# Patient Record
Sex: MALE | Race: WHITE | NOT HISPANIC OR LATINO | URBAN - NONMETROPOLITAN AREA
[De-identification: names, ages, dates, MRNs, and addresses within clinical notes are randomized per-mention and may not be internally consistent; named-entity substitution may affect disease eponyms.]

---

## 2018-06-26 ENCOUNTER — SKIN CHECK (OUTPATIENT)
Dept: URBAN - NONMETROPOLITAN AREA CLINIC 1 | Facility: CLINIC | Age: 63
Setting detail: DERMATOLOGY
End: 2018-06-26

## 2018-06-26 DIAGNOSIS — L57.0 ACTINIC KERATOSIS: ICD-10-CM

## 2018-06-26 PROBLEM — L821 702.19: Status: ACTIVE | Noted: 2018-06-26

## 2018-06-26 PROBLEM — L570 702.0: Status: ACTIVE | Noted: 2018-06-26

## 2018-06-26 PROBLEM — B00.9 HERPESVIRAL INFECTION, UNSPECIFIED: Status: RESOLVED | Noted: 2018-06-26

## 2018-06-26 PROBLEM — B00.9 HERPESVIRAL INFECTION, UNSPECIFIED: Status: ACTIVE | Noted: 2018-06-26

## 2018-06-26 PROCEDURE — 11100 BX SKIN SUBCUTANEOUS&/MUCOUS MEMBRANE 1 LESION: CPT

## 2018-06-26 PROCEDURE — 99203 OFFICE O/P NEW LOW 30 MIN: CPT

## 2018-06-26 PROCEDURE — 17000 DESTRUCT PREMALG LESION: CPT

## 2018-06-26 PROCEDURE — 11101 BIOPSY SKIN SUBQ&/MUCOUS MEMBRANE EA ADDL LESN: CPT

## 2018-06-26 PROCEDURE — 17003 DESTRUCT PREMALG LES 2-14: CPT

## 2018-06-26 RX ORDER — FLUOROURACIL 10 MG/G
1 APPLICATION CREAM TOPICAL BID
Qty: 30 | Refills: 2 | Status: DISCONTINUED
Start: 2018-06-26 | End: 2018-10-02

## 2018-06-26 RX ORDER — ACYCLOVIR 400 MG/1
1 TABLET TABLET ORAL BID
Qty: 60 | Refills: 5 | Status: DISCONTINUED
Start: 2018-06-26 | End: 2019-01-23

## 2018-07-10 ENCOUNTER — ED & C (OUTPATIENT)
Dept: URBAN - NONMETROPOLITAN AREA CLINIC 1 | Facility: CLINIC | Age: 63
Setting detail: DERMATOLOGY
End: 2018-07-10

## 2018-07-10 DIAGNOSIS — L57.0 ACTINIC KERATOSIS: ICD-10-CM

## 2018-07-10 PROCEDURE — 11200 RMVL SKIN TAGS UP TO&INC 15: CPT

## 2018-07-10 PROCEDURE — 17272 DSTR MAL LES S/N/H/F/G 1.1-2: CPT

## 2018-07-10 PROCEDURE — 17262 DSTRJ MAL LES T/A/L 1.1-2.0: CPT

## 2018-07-10 RX ORDER — MUPIROCIN 20 MG/G
1 APPLICATION OINTMENT TOPICAL BID
Qty: 22 | Refills: 2
Start: 2018-07-10

## 2018-07-17 ENCOUNTER — EXCISION (OUTPATIENT)
Dept: URBAN - NONMETROPOLITAN AREA CLINIC 1 | Facility: CLINIC | Age: 63
Setting detail: DERMATOLOGY
End: 2018-07-17

## 2018-07-17 DIAGNOSIS — L57.0 ACTINIC KERATOSIS: ICD-10-CM

## 2018-07-17 PROCEDURE — 12032 INTMD RPR S/A/T/EXT 2.6-7.5: CPT

## 2018-07-17 PROCEDURE — 11100 BX SKIN SUBCUTANEOUS&/MUCOUS MEMBRANE 1 LESION: CPT

## 2018-07-17 PROCEDURE — 11602 EXC TR-EXT MAL+MARG 1.1-2 CM: CPT

## 2018-08-21 ENCOUNTER — EXCISION (OUTPATIENT)
Dept: URBAN - NONMETROPOLITAN AREA CLINIC 1 | Facility: CLINIC | Age: 63
Setting detail: DERMATOLOGY
End: 2018-08-21

## 2018-08-21 DIAGNOSIS — D22.5 MELANOCYTIC NEVI OF TRUNK: ICD-10-CM

## 2018-08-21 DIAGNOSIS — L91.8 OTHER HYPERTROPHIC DISORDERS OF THE SKIN: ICD-10-CM

## 2018-08-21 DIAGNOSIS — L82.1 OTHER SEBORRHEIC KERATOSIS: ICD-10-CM

## 2018-08-21 DIAGNOSIS — Z85.820 PERSONAL HISTORY OF MALIGNANT MELANOMA OF SKIN: ICD-10-CM

## 2018-08-21 DIAGNOSIS — D18.01 HEMANGIOMA OF SKIN AND SUBCUTANEOUS TISSUE: ICD-10-CM

## 2018-08-21 PROCEDURE — 11602 EXC TR-EXT MAL+MARG 1.1-2 CM: CPT

## 2018-08-21 PROCEDURE — 12032 INTMD RPR S/A/T/EXT 2.6-7.5: CPT

## 2018-08-21 PROCEDURE — 17003 DESTRUCT PREMALG LES 2-14: CPT

## 2018-08-21 PROCEDURE — 17000 DESTRUCT PREMALG LESION: CPT

## 2018-08-21 RX ORDER — DOXYCYCLINE HYCLATE 100 MG/1
1 CAPSULE CAPSULE, GELATIN COATED ORAL BID
Qty: 28 | Refills: 0
Start: 2018-08-21

## 2018-09-05 ENCOUNTER — FOLLOW-UP (OUTPATIENT)
Dept: URBAN - NONMETROPOLITAN AREA CLINIC 1 | Facility: CLINIC | Age: 63
Setting detail: DERMATOLOGY
End: 2018-09-05

## 2018-09-05 DIAGNOSIS — L57.0 ACTINIC KERATOSIS: ICD-10-CM

## 2018-09-05 PROCEDURE — 99024 POSTOP FOLLOW-UP VISIT: CPT

## 2018-10-02 ENCOUNTER — SPOT CHECK FOLLOW-UP (OUTPATIENT)
Dept: URBAN - NONMETROPOLITAN AREA CLINIC 1 | Facility: CLINIC | Age: 63
Setting detail: DERMATOLOGY
End: 2018-10-02

## 2018-10-02 DIAGNOSIS — L70.0 ACNE VULGARIS: ICD-10-CM

## 2018-10-02 PROCEDURE — 11100 BX SKIN SUBCUTANEOUS&/MUCOUS MEMBRANE 1 LESION: CPT

## 2018-10-02 PROCEDURE — 99214 OFFICE O/P EST MOD 30 MIN: CPT

## 2018-10-02 RX ORDER — FLUOROURACIL 50 MG/G
1 APPLICATION CREAM TOPICAL BID
Qty: 40 | Refills: 2
Start: 2018-10-02

## 2018-10-16 ENCOUNTER — EXCISION (OUTPATIENT)
Dept: URBAN - NONMETROPOLITAN AREA CLINIC 1 | Facility: CLINIC | Age: 63
Setting detail: DERMATOLOGY
End: 2018-10-16

## 2018-10-16 DIAGNOSIS — D48.5 NEOPLASM OF UNCERTAIN BEHAVIOR OF SKIN: ICD-10-CM

## 2018-10-16 PROCEDURE — 11602 EXC TR-EXT MAL+MARG 1.1-2 CM: CPT

## 2018-10-16 PROCEDURE — 12032 INTMD RPR S/A/T/EXT 2.6-7.5: CPT

## 2018-10-16 PROCEDURE — 11900 INJECT SKIN LESIONS </W 7: CPT

## 2018-10-16 RX ORDER — DOXYCYCLINE HYCLATE 100 MG/1
1 CAPSULE CAPSULE, GELATIN COATED ORAL BID
Qty: 20 | Refills: 0
Start: 2018-10-16

## 2018-10-30 ENCOUNTER — SUTURE REMOVAL (OUTPATIENT)
Dept: URBAN - NONMETROPOLITAN AREA CLINIC 1 | Facility: CLINIC | Age: 63
Setting detail: DERMATOLOGY
End: 2018-10-30

## 2018-10-30 DIAGNOSIS — L57.0 ACTINIC KERATOSIS: ICD-10-CM

## 2018-10-30 PROCEDURE — 99024 POSTOP FOLLOW-UP VISIT: CPT

## 2019-01-23 ENCOUNTER — RX ONLY (RX ONLY)
Age: 64
End: 2019-01-23

## 2019-01-23 RX ORDER — ACYCLOVIR 400 MG/1
1 TABLET TABLET ORAL BID
Qty: 60 | Refills: 5 | Status: DISCONTINUED
Start: 2019-01-23 | End: 2019-08-08

## 2019-05-09 ENCOUNTER — SPOT CHECK FOLLOW-UP (OUTPATIENT)
Dept: URBAN - NONMETROPOLITAN AREA CLINIC 1 | Facility: CLINIC | Age: 64
Setting detail: DERMATOLOGY
End: 2019-05-09

## 2019-05-09 DIAGNOSIS — Z48.817 ENCOUNTER FOR SURGICAL AFTERCARE FOLLOWING SURGERY ON THE SKIN AND SUBCUTANEOUS TISSUE: ICD-10-CM

## 2019-05-09 PROCEDURE — 99214 OFFICE O/P EST MOD 30 MIN: CPT

## 2019-08-08 ENCOUNTER — RX ONLY (RX ONLY)
Age: 64
End: 2019-08-08

## 2019-08-08 RX ORDER — ACYCLOVIR 400 MG/1
1 TABLET TABLET ORAL BID
Qty: 60 | Refills: 5
Start: 2019-08-08

## 2019-12-03 ENCOUNTER — SKIN CHECK (OUTPATIENT)
Dept: URBAN - NONMETROPOLITAN AREA CLINIC 1 | Facility: CLINIC | Age: 64
Setting detail: DERMATOLOGY
End: 2019-12-03

## 2019-12-03 DIAGNOSIS — D48.5 NEOPLASM OF UNCERTAIN BEHAVIOR OF SKIN: ICD-10-CM

## 2019-12-03 PROCEDURE — 99214 OFFICE O/P EST MOD 30 MIN: CPT

## 2019-12-03 PROCEDURE — 11102 TANGNTL BX SKIN SINGLE LES: CPT

## 2019-12-03 RX ORDER — CICLOPIROX 80 MG/ML
1 ML SOLUTION TOPICAL NIGHTLY
Qty: 6.6 | Refills: 3
Start: 2019-12-03

## 2019-12-03 RX ORDER — KETOCONAZOLE 20 MG/G
1 APPLICATION CREAM TOPICAL DAILY
Qty: 60 | Refills: 1 | Status: DISCONTINUED
Start: 2019-12-03 | End: 2020-02-12

## 2020-02-12 ENCOUNTER — RX ONLY (RX ONLY)
Age: 65
End: 2020-02-12

## 2020-02-12 RX ORDER — KETOCONAZOLE 20 MG/G
1 APPLICATION CREAM TOPICAL DAILY
Qty: 60 | Refills: 1
Start: 2020-02-12

## 2020-02-13 ENCOUNTER — RX ONLY (RX ONLY)
Age: 65
End: 2020-02-13

## 2020-02-13 RX ORDER — ACYCLOVIR 400 MG/1
1 TABLET TABLET ORAL BID
Qty: 60 | Refills: 2
Start: 2020-02-13

## 2020-05-19 ENCOUNTER — RX ONLY (RX ONLY)
Age: 65
End: 2020-05-19

## 2020-05-19 RX ORDER — ACYCLOVIR 400 MG/1
1 TABLET TABLET ORAL BID
Qty: 60 | Refills: 0
Start: 2020-05-19

## 2020-06-02 ENCOUNTER — RX ONLY (RX ONLY)
Age: 65
End: 2020-06-02

## 2020-06-02 RX ORDER — FLUOROURACIL 50 MG/G
1 APPLICATION CREAM TOPICAL BID
Qty: 40 | Refills: 1
Start: 2020-06-02

## 2020-06-22 ENCOUNTER — RX ONLY (RX ONLY)
Age: 65
End: 2020-06-22

## 2020-06-22 RX ORDER — ACYCLOVIR 400 MG/1
1 TABLET TABLET ORAL BID
Qty: 60 | Refills: 2
Start: 2020-06-22

## 2020-10-20 ENCOUNTER — RX ONLY (RX ONLY)
Age: 65
End: 2020-10-20

## 2020-10-20 RX ORDER — ACYCLOVIR 400 MG/1
1 TABLET TABLET ORAL TWICE A DAY
Qty: 180 | Refills: 0
Start: 2020-10-20

## 2021-03-01 ENCOUNTER — SKIN CHECK (OUTPATIENT)
Dept: URBAN - NONMETROPOLITAN AREA CLINIC 1 | Facility: CLINIC | Age: 66
Setting detail: DERMATOLOGY
End: 2021-03-01

## 2021-03-01 DIAGNOSIS — L57.0 ACTINIC KERATOSIS: ICD-10-CM

## 2021-03-01 PROCEDURE — 17000 DESTRUCT PREMALG LESION: CPT

## 2021-03-01 PROCEDURE — 11103 TANGNTL BX SKIN EA SEP/ADDL: CPT

## 2021-03-01 PROCEDURE — 11102 TANGNTL BX SKIN SINGLE LES: CPT

## 2021-03-15 ENCOUNTER — RX ONLY (RX ONLY)
Age: 66
End: 2021-03-15

## 2021-03-15 RX ORDER — ACYCLOVIR 400 MG/1
TABLET ORAL
Qty: 180 | Refills: 0
Start: 2021-03-15

## 2021-05-10 ENCOUNTER — RX ONLY (RX ONLY)
Age: 66
End: 2021-05-10

## 2021-05-10 RX ORDER — KETOCONAZOLE 20 MG/G
1 APPLICATION CREAM TOPICAL DAILY
Qty: 60 | Refills: 0
Start: 2021-05-10

## 2021-06-25 ENCOUNTER — RX ONLY (RX ONLY)
Age: 66
End: 2021-06-25

## 2021-06-25 RX ORDER — ACYCLOVIR 400 MG/1
TABLET ORAL
Qty: 180 | Refills: 0
Start: 2021-06-25

## 2021-09-22 ENCOUNTER — RX ONLY (RX ONLY)
Age: 66
End: 2021-09-22

## 2021-09-22 RX ORDER — ACYCLOVIR 400 MG/1
TABLET ORAL
Qty: 180 | Refills: 0
Start: 2021-09-22

## 2022-01-06 ENCOUNTER — RX ONLY (RX ONLY)
Age: 67
End: 2022-01-06

## 2022-01-06 RX ORDER — ACYCLOVIR 400 MG/1
1 TABLET TABLET ORAL TWICE A DAY
Qty: 60 | Refills: 0
Start: 2022-01-06

## 2022-05-23 ENCOUNTER — FOLLOW-UP (OUTPATIENT)
Dept: URBAN - NONMETROPOLITAN AREA CLINIC 1 | Facility: CLINIC | Age: 67
Setting detail: DERMATOLOGY
End: 2022-05-23

## 2022-05-23 DIAGNOSIS — L82.1 OTHER SEBORRHEIC KERATOSIS: ICD-10-CM

## 2022-05-23 PROBLEM — Z85828 V10.83: Status: ACTIVE | Noted: 2022-05-23

## 2022-05-23 PROCEDURE — 99214 OFFICE O/P EST MOD 30 MIN: CPT

## 2022-05-23 PROCEDURE — 17004 DESTROY PREMAL LESIONS 15/>: CPT

## 2022-05-23 PROCEDURE — 17110 DESTRUCT B9 LESION 1-14: CPT

## 2022-05-23 RX ORDER — CICLOPIROX OLAMINE 7.7 MG/G
CREAM TOPICAL
Qty: 90 | Refills: 1
Start: 2022-05-23

## 2022-05-23 RX ORDER — ACYCLOVIR 400 MG/1
1 TABLET TABLET ORAL TWICE A DAY
Qty: 180 | Refills: 0
Start: 2022-05-23

## 2022-09-01 ENCOUNTER — RX ONLY (RX ONLY)
Age: 67
End: 2022-09-01

## 2022-09-01 RX ORDER — ACYCLOVIR 400 MG/1
400 MG TABLET ORAL BID
Qty: 180 | Refills: 1 | Status: ERX | COMMUNITY
Start: 2022-09-01

## 2022-09-06 ENCOUNTER — RX ONLY (RX ONLY)
Age: 67
End: 2022-09-06

## 2022-09-06 RX ORDER — CICLOPIROX OLAMINE 7.7 MG/G
SMALL AMOUNT CREAM TOPICAL BID
Qty: 90 | Refills: 0 | Status: ERX

## 2022-09-26 ENCOUNTER — APPOINTMENT (OUTPATIENT)
Dept: URBAN - NONMETROPOLITAN AREA CLINIC 46 | Age: 67
Setting detail: DERMATOLOGY
End: 2022-09-26

## 2022-09-26 DIAGNOSIS — Z85.828 PERSONAL HISTORY OF OTHER MALIGNANT NEOPLASM OF SKIN: ICD-10-CM

## 2022-09-26 DIAGNOSIS — L57.0 ACTINIC KERATOSIS: ICD-10-CM

## 2022-09-26 DIAGNOSIS — D485 NEOPLASM OF UNCERTAIN BEHAVIOR OF SKIN: ICD-10-CM

## 2022-09-26 DIAGNOSIS — L81.4 OTHER MELANIN HYPERPIGMENTATION: ICD-10-CM

## 2022-09-26 DIAGNOSIS — L82.1 OTHER SEBORRHEIC KERATOSIS: ICD-10-CM

## 2022-09-26 PROBLEM — D48.5 NEOPLASM OF UNCERTAIN BEHAVIOR OF SKIN: Status: ACTIVE | Noted: 2022-09-26

## 2022-09-26 PROCEDURE — OTHER BIOPSY BY SHAVE METHOD: OTHER

## 2022-09-26 PROCEDURE — 11103 TANGNTL BX SKIN EA SEP/ADDL: CPT

## 2022-09-26 PROCEDURE — 17003 DESTRUCT PREMALG LES 2-14: CPT

## 2022-09-26 PROCEDURE — 17000 DESTRUCT PREMALG LESION: CPT | Mod: 59

## 2022-09-26 PROCEDURE — OTHER COUNSELING: OTHER

## 2022-09-26 PROCEDURE — 99213 OFFICE O/P EST LOW 20 MIN: CPT | Mod: 25

## 2022-09-26 PROCEDURE — OTHER LIQUID NITROGEN: OTHER

## 2022-09-26 PROCEDURE — 11102 TANGNTL BX SKIN SINGLE LES: CPT

## 2022-09-26 ASSESSMENT — LOCATION SIMPLE DESCRIPTION DERM
LOCATION SIMPLE: LEFT FOREARM
LOCATION SIMPLE: RIGHT FOREARM
LOCATION SIMPLE: LEFT PRETIBIAL REGION
LOCATION SIMPLE: RIGHT SHOULDER
LOCATION SIMPLE: LEFT UPPER BACK

## 2022-09-26 ASSESSMENT — LOCATION DETAILED DESCRIPTION DERM
LOCATION DETAILED: LEFT DISTAL DORSAL FOREARM
LOCATION DETAILED: RIGHT DISTAL DORSAL FOREARM
LOCATION DETAILED: LEFT DISTAL PRETIBIAL REGION
LOCATION DETAILED: RIGHT POSTERIOR SHOULDER
LOCATION DETAILED: LEFT PROXIMAL PRETIBIAL REGION
LOCATION DETAILED: LEFT SUPERIOR UPPER BACK

## 2022-09-26 ASSESSMENT — LOCATION ZONE DERM
LOCATION ZONE: LEG
LOCATION ZONE: ARM
LOCATION ZONE: TRUNK

## 2022-09-26 NOTE — PROCEDURE: LIQUID NITROGEN
Duration Of Freeze Thaw-Cycle (Seconds): 0
Consent: The patient's consent was obtained including but not limited to risks of crusting, scabbing, blistering, scarring, darker or lighter pigmentary change, recurrence, incomplete removal and infection.
Show Aperture Variable?: Yes
Post-Care Instructions: I reviewed with the patient in detail post-care instructions. Patient is to wear sunprotection, and avoid picking at any of the treated lesions. Pt may apply Vaseline to crusted or scabbing areas.
Detail Level: Simple
Render Note In Bullet Format When Appropriate: No
Number Of Freeze-Thaw Cycles: 3 freeze-thaw cycles

## 2022-10-03 ENCOUNTER — RX ONLY (RX ONLY)
Age: 67
End: 2022-10-03

## 2022-10-03 RX ORDER — FLUOROURACIL 5 MG/G
CREAM TOPICAL
Qty: 40 | Refills: 0 | Status: ERX | COMMUNITY
Start: 2022-10-03

## 2022-11-28 ENCOUNTER — APPOINTMENT (OUTPATIENT)
Dept: URBAN - NONMETROPOLITAN AREA CLINIC 46 | Age: 67
Setting detail: DERMATOLOGY
End: 2022-11-28

## 2022-11-28 DIAGNOSIS — Z71.89 OTHER SPECIFIED COUNSELING: ICD-10-CM

## 2022-11-28 DIAGNOSIS — Z85.828 PERSONAL HISTORY OF OTHER MALIGNANT NEOPLASM OF SKIN: ICD-10-CM

## 2022-11-28 DIAGNOSIS — L81.4 OTHER MELANIN HYPERPIGMENTATION: ICD-10-CM

## 2022-11-28 DIAGNOSIS — D18.0 HEMANGIOMA: ICD-10-CM

## 2022-11-28 DIAGNOSIS — D22 MELANOCYTIC NEVI: ICD-10-CM

## 2022-11-28 DIAGNOSIS — L57.0 ACTINIC KERATOSIS: ICD-10-CM

## 2022-11-28 DIAGNOSIS — L82.0 INFLAMED SEBORRHEIC KERATOSIS: ICD-10-CM

## 2022-11-28 DIAGNOSIS — D485 NEOPLASM OF UNCERTAIN BEHAVIOR OF SKIN: ICD-10-CM

## 2022-11-28 DIAGNOSIS — L82.1 OTHER SEBORRHEIC KERATOSIS: ICD-10-CM

## 2022-11-28 PROBLEM — D18.01 HEMANGIOMA OF SKIN AND SUBCUTANEOUS TISSUE: Status: ACTIVE | Noted: 2022-11-28

## 2022-11-28 PROBLEM — D22.5 MELANOCYTIC NEVI OF TRUNK: Status: ACTIVE | Noted: 2022-11-28

## 2022-11-28 PROBLEM — D48.5 NEOPLASM OF UNCERTAIN BEHAVIOR OF SKIN: Status: ACTIVE | Noted: 2022-11-28

## 2022-11-28 PROCEDURE — 99213 OFFICE O/P EST LOW 20 MIN: CPT | Mod: 25

## 2022-11-28 PROCEDURE — 11103 TANGNTL BX SKIN EA SEP/ADDL: CPT | Mod: 59

## 2022-11-28 PROCEDURE — OTHER COUNSELING: OTHER

## 2022-11-28 PROCEDURE — OTHER LIQUID NITROGEN: OTHER

## 2022-11-28 PROCEDURE — 11102 TANGNTL BX SKIN SINGLE LES: CPT | Mod: 59

## 2022-11-28 PROCEDURE — 17110 DESTRUCT B9 LESION 1-14: CPT

## 2022-11-28 PROCEDURE — 17000 DESTRUCT PREMALG LESION: CPT | Mod: 59

## 2022-11-28 PROCEDURE — 17003 DESTRUCT PREMALG LES 2-14: CPT | Mod: 59

## 2022-11-28 PROCEDURE — OTHER MIPS QUALITY: OTHER

## 2022-11-28 PROCEDURE — OTHER BIOPSY BY SHAVE METHOD: OTHER

## 2022-11-28 ASSESSMENT — LOCATION DETAILED DESCRIPTION DERM
LOCATION DETAILED: RIGHT MEDIAL DISTAL PRETIBIAL REGION
LOCATION DETAILED: LEFT PROXIMAL PRETIBIAL REGION
LOCATION DETAILED: LEFT INFERIOR UPPER BACK
LOCATION DETAILED: RIGHT ELBOW
LOCATION DETAILED: LEFT DISTAL DORSAL FOREARM
LOCATION DETAILED: RIGHT DISTAL CALF
LOCATION DETAILED: LEFT SUPERIOR CENTRAL MALAR CHEEK
LOCATION DETAILED: RIGHT INFERIOR UPPER BACK
LOCATION DETAILED: LEFT MEDIAL ZYGOMA
LOCATION DETAILED: LEFT PROXIMAL DORSAL FOREARM
LOCATION DETAILED: INFERIOR THORACIC SPINE
LOCATION DETAILED: RIGHT INFERIOR MEDIAL UPPER BACK
LOCATION DETAILED: RIGHT KNEE

## 2022-11-28 ASSESSMENT — LOCATION ZONE DERM
LOCATION ZONE: FACE
LOCATION ZONE: ARM
LOCATION ZONE: TRUNK
LOCATION ZONE: LEG

## 2022-11-28 ASSESSMENT — LOCATION SIMPLE DESCRIPTION DERM
LOCATION SIMPLE: LEFT CHEEK
LOCATION SIMPLE: LEFT PRETIBIAL REGION
LOCATION SIMPLE: LEFT FOREARM
LOCATION SIMPLE: RIGHT UPPER BACK
LOCATION SIMPLE: RIGHT KNEE
LOCATION SIMPLE: RIGHT ELBOW
LOCATION SIMPLE: RIGHT CALF
LOCATION SIMPLE: LEFT ZYGOMA
LOCATION SIMPLE: LEFT UPPER BACK
LOCATION SIMPLE: RIGHT PRETIBIAL REGION
LOCATION SIMPLE: UPPER BACK

## 2022-11-28 NOTE — HPI: PREVENTATIVE SKIN CHECK
What Is The Reason For Today's Visit?: Full Body Skin Examination
Additional History: Lesions of concern on Lower legs, tender, red, scaly.

## 2022-11-28 NOTE — PROCEDURE: LIQUID NITROGEN
Render Note In Bullet Format When Appropriate: No
Show Aperture Variable?: Yes
Post-Care Instructions: I reviewed with the patient in detail post-care instructions. Patient is to wear sunprotection, and avoid picking at any of the treated lesions. Pt may apply Vaseline to crusted or scabbing areas.
Number Of Freeze-Thaw Cycles: 3 freeze-thaw cycles
Consent: The patient's consent was obtained including but not limited to risks of crusting, scabbing, blistering, scarring, darker or lighter pigmentary change, recurrence, incomplete removal and infection.
Detail Level: Simple
Duration Of Freeze Thaw-Cycle (Seconds): 0
Spray Paint Text: The liquid nitrogen was applied to the skin utilizing a spray paint frosting technique.
Medical Necessity Clause: This procedure was medically necessary because the lesions that were treated were:
Medical Necessity Information: It is in your best interest to select a reason for this procedure from the list below. All of these items fulfill various CMS LCD requirements except the new and changing color options.

## 2022-11-28 NOTE — PROCEDURE: MIPS QUALITY
Quality 130: Documentation Of Current Medications In The Medical Record: Current Medications Documented
Detail Level: Detailed
Quality 110: Preventive Care And Screening: Influenza Immunization: Influenza Immunization previously received during influenza season
Quality 226: Preventive Care And Screening: Tobacco Use: Screening And Cessation Intervention: Patient screened for tobacco use and is an ex/non-smoker
Quality 111:Pneumonia Vaccination Status For Older Adults: Pneumococcal vaccine (PPSV23) administered on or after patient’s 60th birthday and before the end of the measurement period

## 2022-12-19 ENCOUNTER — RX ONLY (RX ONLY)
Age: 67
End: 2022-12-19

## 2022-12-19 RX ORDER — CICLOPIROX OLAMINE 7.7 MG/G
SMALL AMOUNT CREAM TOPICAL BID
Qty: 90 | Refills: 1 | Status: ERX

## 2023-06-05 ENCOUNTER — APPOINTMENT (OUTPATIENT)
Dept: URBAN - NONMETROPOLITAN AREA CLINIC 46 | Age: 68
Setting detail: DERMATOLOGY
End: 2023-06-06

## 2023-06-05 DIAGNOSIS — L82.0 INFLAMED SEBORRHEIC KERATOSIS: ICD-10-CM

## 2023-06-05 DIAGNOSIS — Z85.828 PERSONAL HISTORY OF OTHER MALIGNANT NEOPLASM OF SKIN: ICD-10-CM

## 2023-06-05 DIAGNOSIS — L82.1 OTHER SEBORRHEIC KERATOSIS: ICD-10-CM

## 2023-06-05 DIAGNOSIS — L81.4 OTHER MELANIN HYPERPIGMENTATION: ICD-10-CM

## 2023-06-05 DIAGNOSIS — D22 MELANOCYTIC NEVI: ICD-10-CM

## 2023-06-05 DIAGNOSIS — Z71.89 OTHER SPECIFIED COUNSELING: ICD-10-CM

## 2023-06-05 DIAGNOSIS — L72.0 EPIDERMAL CYST: ICD-10-CM

## 2023-06-05 DIAGNOSIS — D18.0 HEMANGIOMA: ICD-10-CM

## 2023-06-05 DIAGNOSIS — B07.8 OTHER VIRAL WARTS: ICD-10-CM

## 2023-06-05 DIAGNOSIS — L57.0 ACTINIC KERATOSIS: ICD-10-CM

## 2023-06-05 PROBLEM — D22.5 MELANOCYTIC NEVI OF TRUNK: Status: ACTIVE | Noted: 2023-06-05

## 2023-06-05 PROBLEM — D18.01 HEMANGIOMA OF SKIN AND SUBCUTANEOUS TISSUE: Status: ACTIVE | Noted: 2023-06-05

## 2023-06-05 PROCEDURE — OTHER ADDITIONAL NOTES: OTHER

## 2023-06-05 PROCEDURE — 17000 DESTRUCT PREMALG LESION: CPT | Mod: 59

## 2023-06-05 PROCEDURE — OTHER LIQUID NITROGEN: OTHER

## 2023-06-05 PROCEDURE — OTHER COUNSELING: OTHER

## 2023-06-05 PROCEDURE — OTHER MIPS QUALITY: OTHER

## 2023-06-05 PROCEDURE — 99213 OFFICE O/P EST LOW 20 MIN: CPT | Mod: 25

## 2023-06-05 PROCEDURE — 17003 DESTRUCT PREMALG LES 2-14: CPT | Mod: 59

## 2023-06-05 PROCEDURE — 17110 DESTRUCT B9 LESION 1-14: CPT

## 2023-06-05 ASSESSMENT — LOCATION DETAILED DESCRIPTION DERM
LOCATION DETAILED: RIGHT INFERIOR UPPER BACK
LOCATION DETAILED: INFERIOR THORACIC SPINE
LOCATION DETAILED: RIGHT INFERIOR MEDIAL UPPER BACK
LOCATION DETAILED: RIGHT DISTAL DORSAL FOREARM
LOCATION DETAILED: LEFT INFERIOR UPPER BACK
LOCATION DETAILED: RIGHT MEDIAL DISTAL PRETIBIAL REGION
LOCATION DETAILED: RIGHT CLAVICULAR NECK
LOCATION DETAILED: LEFT PROXIMAL DORSAL MIDDLE FINGER

## 2023-06-05 ASSESSMENT — LOCATION ZONE DERM
LOCATION ZONE: LEG
LOCATION ZONE: ARM
LOCATION ZONE: TRUNK
LOCATION ZONE: FINGER
LOCATION ZONE: NECK

## 2023-06-05 ASSESSMENT — LOCATION SIMPLE DESCRIPTION DERM
LOCATION SIMPLE: RIGHT PRETIBIAL REGION
LOCATION SIMPLE: RIGHT FOREARM
LOCATION SIMPLE: LEFT UPPER BACK
LOCATION SIMPLE: LEFT MIDDLE FINGER
LOCATION SIMPLE: RIGHT ANTERIOR NECK
LOCATION SIMPLE: RIGHT UPPER BACK
LOCATION SIMPLE: UPPER BACK

## 2023-06-05 NOTE — PROCEDURE: COUNSELING
Detail Level: Simple
Detail Level: Detailed
Detail Level: Generalized
Topical Retinoids Recommendations: Adapelene 0.1% gel\\nApply to face nightly as tolerated

## 2023-06-05 NOTE — HPI: PREVENTATIVE SKIN CHECK
What Is The Reason For Today's Visit?: Full Body Skin Examination
Additional History: Lesion on concern \\nRight lower leg, painful, pink and scaly

## 2023-06-05 NOTE — PROCEDURE: ADDITIONAL NOTES
Detail Level: Detailed
Additional Notes: Courtesy extraction per Sherlyn
Render Risk Assessment In Note?: yes

## 2024-06-25 ENCOUNTER — RX ONLY (RX ONLY)
Age: 69
End: 2024-06-25

## 2024-06-25 RX ORDER — ACYCLOVIR 400 MG/1
400 MG TABLET ORAL BID
Qty: 90 | Refills: 0 | Status: ERX